# Patient Record
Sex: MALE | Race: BLACK OR AFRICAN AMERICAN | NOT HISPANIC OR LATINO | URBAN - METROPOLITAN AREA
[De-identification: names, ages, dates, MRNs, and addresses within clinical notes are randomized per-mention and may not be internally consistent; named-entity substitution may affect disease eponyms.]

---

## 2024-01-10 ENCOUNTER — EMERGENCY (EMERGENCY)
Facility: HOSPITAL | Age: 34
LOS: 1 days | Discharge: HOME | End: 2024-01-10
Admitting: EMERGENCY MEDICINE
Payer: COMMERCIAL

## 2024-01-10 VITALS
DIASTOLIC BLOOD PRESSURE: 66 MMHG | SYSTOLIC BLOOD PRESSURE: 106 MMHG | RESPIRATION RATE: 16 BRPM | TEMPERATURE: 99 F | OXYGEN SATURATION: 99 % | HEART RATE: 77 BPM

## 2024-01-10 VITALS
RESPIRATION RATE: 18 BRPM | HEIGHT: 68 IN | WEIGHT: 130.07 LBS | DIASTOLIC BLOOD PRESSURE: 72 MMHG | TEMPERATURE: 97 F | SYSTOLIC BLOOD PRESSURE: 120 MMHG | OXYGEN SATURATION: 94 % | HEART RATE: 75 BPM

## 2024-01-10 LAB
ALBUMIN SERPL ELPH-MCNC: 3.9 G/DL — SIGNIFICANT CHANGE UP (ref 3.4–5)
ALBUMIN SERPL ELPH-MCNC: 3.9 G/DL — SIGNIFICANT CHANGE UP (ref 3.4–5)
ALP SERPL-CCNC: 97 U/L — SIGNIFICANT CHANGE UP (ref 40–120)
ALP SERPL-CCNC: 97 U/L — SIGNIFICANT CHANGE UP (ref 40–120)
ALT FLD-CCNC: 13 U/L — SIGNIFICANT CHANGE UP (ref 12–42)
ALT FLD-CCNC: 13 U/L — SIGNIFICANT CHANGE UP (ref 12–42)
ANION GAP SERPL CALC-SCNC: 8 MMOL/L — LOW (ref 9–16)
ANION GAP SERPL CALC-SCNC: 8 MMOL/L — LOW (ref 9–16)
AST SERPL-CCNC: 21 U/L — SIGNIFICANT CHANGE UP (ref 15–37)
AST SERPL-CCNC: 21 U/L — SIGNIFICANT CHANGE UP (ref 15–37)
BASOPHILS # BLD AUTO: 0.05 K/UL — SIGNIFICANT CHANGE UP (ref 0–0.2)
BASOPHILS # BLD AUTO: 0.05 K/UL — SIGNIFICANT CHANGE UP (ref 0–0.2)
BASOPHILS NFR BLD AUTO: 0.5 % — SIGNIFICANT CHANGE UP (ref 0–2)
BASOPHILS NFR BLD AUTO: 0.5 % — SIGNIFICANT CHANGE UP (ref 0–2)
BILIRUB SERPL-MCNC: 0.3 MG/DL — SIGNIFICANT CHANGE UP (ref 0.2–1.2)
BILIRUB SERPL-MCNC: 0.3 MG/DL — SIGNIFICANT CHANGE UP (ref 0.2–1.2)
BUN SERPL-MCNC: 14 MG/DL — SIGNIFICANT CHANGE UP (ref 7–23)
BUN SERPL-MCNC: 14 MG/DL — SIGNIFICANT CHANGE UP (ref 7–23)
CALCIUM SERPL-MCNC: 8.9 MG/DL — SIGNIFICANT CHANGE UP (ref 8.5–10.5)
CALCIUM SERPL-MCNC: 8.9 MG/DL — SIGNIFICANT CHANGE UP (ref 8.5–10.5)
CHLORIDE SERPL-SCNC: 102 MMOL/L — SIGNIFICANT CHANGE UP (ref 96–108)
CHLORIDE SERPL-SCNC: 102 MMOL/L — SIGNIFICANT CHANGE UP (ref 96–108)
CO2 SERPL-SCNC: 28 MMOL/L — SIGNIFICANT CHANGE UP (ref 22–31)
CO2 SERPL-SCNC: 28 MMOL/L — SIGNIFICANT CHANGE UP (ref 22–31)
CREAT SERPL-MCNC: 0.82 MG/DL — SIGNIFICANT CHANGE UP (ref 0.5–1.3)
CREAT SERPL-MCNC: 0.82 MG/DL — SIGNIFICANT CHANGE UP (ref 0.5–1.3)
EGFR: 118 ML/MIN/1.73M2 — SIGNIFICANT CHANGE UP
EGFR: 118 ML/MIN/1.73M2 — SIGNIFICANT CHANGE UP
EOSINOPHIL # BLD AUTO: 0.01 K/UL — SIGNIFICANT CHANGE UP (ref 0–0.5)
EOSINOPHIL # BLD AUTO: 0.01 K/UL — SIGNIFICANT CHANGE UP (ref 0–0.5)
EOSINOPHIL NFR BLD AUTO: 0.1 % — SIGNIFICANT CHANGE UP (ref 0–6)
EOSINOPHIL NFR BLD AUTO: 0.1 % — SIGNIFICANT CHANGE UP (ref 0–6)
GLUCOSE SERPL-MCNC: 104 MG/DL — HIGH (ref 70–99)
GLUCOSE SERPL-MCNC: 104 MG/DL — HIGH (ref 70–99)
HCT VFR BLD CALC: 36.5 % — LOW (ref 39–50)
HCT VFR BLD CALC: 36.5 % — LOW (ref 39–50)
HGB BLD-MCNC: 12.9 G/DL — LOW (ref 13–17)
HGB BLD-MCNC: 12.9 G/DL — LOW (ref 13–17)
IMM GRANULOCYTES NFR BLD AUTO: 0.4 % — SIGNIFICANT CHANGE UP (ref 0–0.9)
IMM GRANULOCYTES NFR BLD AUTO: 0.4 % — SIGNIFICANT CHANGE UP (ref 0–0.9)
LACTATE BLDV-MCNC: 0.8 MMOL/L — SIGNIFICANT CHANGE UP (ref 0.5–2)
LACTATE BLDV-MCNC: 0.8 MMOL/L — SIGNIFICANT CHANGE UP (ref 0.5–2)
LYMPHOCYTES # BLD AUTO: 1.65 K/UL — SIGNIFICANT CHANGE UP (ref 1–3.3)
LYMPHOCYTES # BLD AUTO: 1.65 K/UL — SIGNIFICANT CHANGE UP (ref 1–3.3)
LYMPHOCYTES # BLD AUTO: 16.9 % — SIGNIFICANT CHANGE UP (ref 13–44)
LYMPHOCYTES # BLD AUTO: 16.9 % — SIGNIFICANT CHANGE UP (ref 13–44)
MAGNESIUM SERPL-MCNC: 2.1 MG/DL — SIGNIFICANT CHANGE UP (ref 1.6–2.6)
MAGNESIUM SERPL-MCNC: 2.1 MG/DL — SIGNIFICANT CHANGE UP (ref 1.6–2.6)
MCHC RBC-ENTMCNC: 31.2 PG — SIGNIFICANT CHANGE UP (ref 27–34)
MCHC RBC-ENTMCNC: 31.2 PG — SIGNIFICANT CHANGE UP (ref 27–34)
MCHC RBC-ENTMCNC: 35.3 GM/DL — SIGNIFICANT CHANGE UP (ref 32–36)
MCHC RBC-ENTMCNC: 35.3 GM/DL — SIGNIFICANT CHANGE UP (ref 32–36)
MCV RBC AUTO: 88.4 FL — SIGNIFICANT CHANGE UP (ref 80–100)
MCV RBC AUTO: 88.4 FL — SIGNIFICANT CHANGE UP (ref 80–100)
MONOCYTES # BLD AUTO: 0.79 K/UL — SIGNIFICANT CHANGE UP (ref 0–0.9)
MONOCYTES # BLD AUTO: 0.79 K/UL — SIGNIFICANT CHANGE UP (ref 0–0.9)
MONOCYTES NFR BLD AUTO: 8.1 % — SIGNIFICANT CHANGE UP (ref 2–14)
MONOCYTES NFR BLD AUTO: 8.1 % — SIGNIFICANT CHANGE UP (ref 2–14)
NEUTROPHILS # BLD AUTO: 7.25 K/UL — SIGNIFICANT CHANGE UP (ref 1.8–7.4)
NEUTROPHILS # BLD AUTO: 7.25 K/UL — SIGNIFICANT CHANGE UP (ref 1.8–7.4)
NEUTROPHILS NFR BLD AUTO: 74 % — SIGNIFICANT CHANGE UP (ref 43–77)
NEUTROPHILS NFR BLD AUTO: 74 % — SIGNIFICANT CHANGE UP (ref 43–77)
NRBC # BLD: 0 /100 WBCS — SIGNIFICANT CHANGE UP (ref 0–0)
NRBC # BLD: 0 /100 WBCS — SIGNIFICANT CHANGE UP (ref 0–0)
PLATELET # BLD AUTO: 281 K/UL — SIGNIFICANT CHANGE UP (ref 150–400)
PLATELET # BLD AUTO: 281 K/UL — SIGNIFICANT CHANGE UP (ref 150–400)
POTASSIUM SERPL-MCNC: 3.8 MMOL/L — SIGNIFICANT CHANGE UP (ref 3.5–5.3)
POTASSIUM SERPL-MCNC: 3.8 MMOL/L — SIGNIFICANT CHANGE UP (ref 3.5–5.3)
POTASSIUM SERPL-SCNC: 3.8 MMOL/L — SIGNIFICANT CHANGE UP (ref 3.5–5.3)
POTASSIUM SERPL-SCNC: 3.8 MMOL/L — SIGNIFICANT CHANGE UP (ref 3.5–5.3)
PROT SERPL-MCNC: 7.2 G/DL — SIGNIFICANT CHANGE UP (ref 6.4–8.2)
PROT SERPL-MCNC: 7.2 G/DL — SIGNIFICANT CHANGE UP (ref 6.4–8.2)
RBC # BLD: 4.13 M/UL — LOW (ref 4.2–5.8)
RBC # BLD: 4.13 M/UL — LOW (ref 4.2–5.8)
RBC # FLD: 12.5 % — SIGNIFICANT CHANGE UP (ref 10.3–14.5)
RBC # FLD: 12.5 % — SIGNIFICANT CHANGE UP (ref 10.3–14.5)
SODIUM SERPL-SCNC: 138 MMOL/L — SIGNIFICANT CHANGE UP (ref 132–145)
SODIUM SERPL-SCNC: 138 MMOL/L — SIGNIFICANT CHANGE UP (ref 132–145)
TROPONIN I, HIGH SENSITIVITY RESULT: <4 NG/L — SIGNIFICANT CHANGE UP
TROPONIN I, HIGH SENSITIVITY RESULT: <4 NG/L — SIGNIFICANT CHANGE UP
WBC # BLD: 9.79 K/UL — SIGNIFICANT CHANGE UP (ref 3.8–10.5)
WBC # BLD: 9.79 K/UL — SIGNIFICANT CHANGE UP (ref 3.8–10.5)
WBC # FLD AUTO: 9.79 K/UL — SIGNIFICANT CHANGE UP (ref 3.8–10.5)
WBC # FLD AUTO: 9.79 K/UL — SIGNIFICANT CHANGE UP (ref 3.8–10.5)

## 2024-01-10 PROCEDURE — 71045 X-RAY EXAM CHEST 1 VIEW: CPT | Mod: 26

## 2024-01-10 PROCEDURE — 99285 EMERGENCY DEPT VISIT HI MDM: CPT

## 2024-01-10 RX ORDER — KETOROLAC TROMETHAMINE 30 MG/ML
15 SYRINGE (ML) INJECTION ONCE
Refills: 0 | Status: DISCONTINUED | OUTPATIENT
Start: 2024-01-10 | End: 2024-01-10

## 2024-01-10 RX ORDER — METOCLOPRAMIDE HCL 10 MG
10 TABLET ORAL ONCE
Refills: 0 | Status: COMPLETED | OUTPATIENT
Start: 2024-01-10 | End: 2024-01-10

## 2024-01-10 RX ORDER — SODIUM CHLORIDE 9 MG/ML
1000 INJECTION INTRAMUSCULAR; INTRAVENOUS; SUBCUTANEOUS ONCE
Refills: 0 | Status: COMPLETED | OUTPATIENT
Start: 2024-01-10 | End: 2024-01-10

## 2024-01-10 RX ADMIN — SODIUM CHLORIDE 1000 MILLILITER(S): 9 INJECTION INTRAMUSCULAR; INTRAVENOUS; SUBCUTANEOUS at 17:00

## 2024-01-10 NOTE — ED PROVIDER NOTE - PATIENT PORTAL LINK FT
You can access the FollowMyHealth Patient Portal offered by [facility] by registering at the following website: [email or web address]. By joining [employer]’s FollowMyHealth portal, you will also be able to view your health information using other applications (apps) compatible with our system. You can access the FollowMyHealth Patient Portal offered by [facility] by registering at the following website: [email or web address]. By joining [employer]’s FollowMyHealth portal, you will also be able to view your health information using other applications (apps) compatible with our system.

## 2024-01-10 NOTE — ED PROVIDER NOTE - CARE PROVIDER_API CALL
your primary care doctor,   Phone: (   )    -  Fax: (   )    -  Follow Up Time: 4-6 Days    your neurologist,   Phone: (   )    -  Fax: (   )    -  Follow Up Time: 4-6 Days

## 2024-01-10 NOTE — ED ADULT NURSE NOTE - PAIN: PRESENCE, MLM
denies pain/discomfort (Rating = 0) Interpolation Flap Text: A decision was made to reconstruct the defect utilizing an interpolation axial flap and a staged reconstruction.  A telfa template was made of the defect.  This telfa template was then used to outline the interpolation flap.  The donor area for the pedicle flap was then injected with anesthesia.  The flap was excised through the skin and subcutaneous tissue down to the layer of the underlying musculature.  The interpolation flap was carefully excised within this deep plane to maintain its blood supply.  The edges of the donor site were undermined.   The donor site was closed in a primary fashion.  The pedicle was then rotated into position and sutured.  Once the tube was sutured into place, adequate blood supply was confirmed with blanching and refill.  The pedicle was then wrapped with xeroform gauze and dressed appropriately with a telfa and gauze bandage to ensure continued blood supply and protect the attached pedicle.

## 2024-01-10 NOTE — ED PROVIDER NOTE - PHYSICAL EXAMINATION
CONSTITUTIONAL: non-toxic appearing male, nad   SKIN: skin exam is warm and dry  HEAD: Normocephalic; atraumatic  EYES: PERRL, EOM intact; conjunctiva and sclera clear.  ENT: Dry MM   NECK: ROM intact.  CARD: S1, S2 normal, no murmur  RESP: No wheezes, rales or rhonchi. Good air movement bilaterally  ABD: soft; non-distended; non-tender.    EXT: Normal ROM.    NEURO: awake, alert, following commands, oriented, grossly unremarkable. No Focal deficits. GCS 15.   PSYCH: Cooperative, appropriate. CONSTITUTIONAL: non-toxic appearing male, nad   SKIN: skin exam is warm and dry  HEAD: Normocephalic; atraumatic  EYES: PERRL, EOM intact; conjunctiva and sclera clear.  ENT: Dry MM   NECK: ROM intact.  CARD: S1, S2 normal, no murmur  RESP: No wheezes, rales or rhonchi. Good air movement bilaterally  ABD: soft; non-distended; non-tender.    EXT: Normal ROM.    NEURO: awake, alert, following commands, oriented, grossly unremarkable. No Focal deficits. GCS 15. Steady gait.   PSYCH: Cooperative, appropriate.

## 2024-01-10 NOTE — ED ADULT TRIAGE NOTE - CHIEF COMPLAINT QUOTE
Pt BIBA from work c/o feeling dizzy with shortness of breath and tremors while at work. Pt states symptoms resolved but he feels fatigued. Pt took inhaler (PMH of asthma and anemia) without relief. Pt states he had similar incident last week, went to PCP who wrote him for MRI of head today. Pt unable to go to scan due to condition.

## 2024-01-10 NOTE — ED ADULT NURSE NOTE - NSFALLUNIVINTERV_ED_ALL_ED
Bed/Stretcher in lowest position, wheels locked, appropriate side rails in place/Call bell, personal items and telephone in reach/Instruct patient to call for assistance before getting out of bed/chair/stretcher/Non-slip footwear applied when patient is off stretcher/[place] to call system/Physically safe environment - no spills, clutter or unnecessary equipment/Purposeful proactive rounding/Room/bathroom lighting operational, light cord in reach Bed/Stretcher in lowest position, wheels locked, appropriate side rails in place/Call bell, personal items and telephone in reach/Instruct patient to call for assistance before getting out of bed/chair/stretcher/Non-slip footwear applied when patient is off stretcher/[place] to call system/Physically safe environment - no spills, clutter or unnecessary equipment/Purposeful proactive rounding/Room/bathroom lighting operational, light cord in reach

## 2024-01-10 NOTE — ED PROVIDER NOTE - NSFOLLOWUPINSTRUCTIONS_ED_ALL_ED_FT
You were noted to have what is called, a syncopal episode, which is an event when you temporarily lose consciousness. These events happen for a variety of reasons and you were kept in the hospital to evaluate what the cause of your event was. Thankfully, these events in themselves do not leave any long lasting effects on your body, however, they may happen again if the cause of the problem is not found and treated if need be. Many people never find out what caused their event. If you have been advised to follow up with any doctors, or specialists, please be sure to do so.       Dizziness  Dizziness is a common problem. It is a feeling of unsteadiness or light-headedness. You may feel like you are about to faint. Dizziness can lead to injury if you stumble or fall. Anyone can become dizzy, but dizziness is more common in older adults. This condition can be caused by a number of things, including medicines, dehydration, or illness.    Follow these instructions at home:  Eating and drinking     Drink enough fluid to keep your urine clear or pale yellow. This helps to keep you from becoming dehydrated. Try to drink more clear fluids, such as water.  Do not drink alcohol.  Limit your caffeine intake if told to do so by your health care provider. Check ingredients and nutrition facts to see if a food or beverage contains caffeine.  Limit your salt (sodium) intake if told to do so by your health care provider. Check ingredients and nutrition facts to see if a food or beverage contains sodium.  Activity     Avoid making quick movements.  Rise slowly from chairs and steady yourself until you feel okay.  In the morning, first sit up on the side of the bed. When you feel okay, stand slowly while you hold onto something until you know that your balance is fine.  If you need to  one place for a long time, move your legs often. Tighten and relax the muscles in your legs while you are standing.  Do not drive or use heavy machinery if you feel dizzy.  Avoid bending down if you feel dizzy. Place items in your home so that they are easy for you to reach without leaning over.  Lifestyle     Do not use any products that contain nicotine or tobacco, such as cigarettes and e-cigarettes. If you need help quitting, ask your health care provider.  Try to reduce your stress level by using methods such as yoga or meditation. Talk with your health care provider if you need help to manage your stress.  General instructions     Watch your dizziness for any changes.  Take over-the-counter and prescription medicines only as told by your health care provider. Talk with your health care provider if you think that your dizziness is caused by a medicine that you are taking.  Tell a friend or a family member that you are feeling dizzy. If he or she notices any changes in your behavior, have this person call your health care provider.  Keep all follow-up visits as told by your health care provider. This is important.  Contact a health care provider if:  Your dizziness does not go away.  Your dizziness or light-headedness gets worse.  You feel nauseous.  You have reduced hearing.  You have new symptoms.  You are unsteady on your feet or you feel like the room is spinning.  Get help right away if:  You vomit or have diarrhea and are unable to eat or drink anything.  You have problems talking, walking, swallowing, or using your arms, hands, or legs.  You feel generally weak.  You are not thinking clearly or you have trouble forming sentences. It may take a friend or family member to notice this.  You have chest pain, abdominal pain, shortness of breath, or sweating.  Your vision changes.  You have any bleeding.  You have a severe headache.  You have neck pain or a stiff neck.  You have a fever.  These symptoms may represent a serious problem that is an emergency. Do not wait to see if the symptoms will go away. Get medical help right away. Call your local emergency services (911 in the [place] Do not drive yourself to the hospital.     Summary  Dizziness is a feeling of unsteadiness or light-headedness. This condition can be caused by a number of things, including medicines, dehydration, or illness.  Anyone can become dizzy, but dizziness is more common in older adults.  Drink enough fluid to keep your urine clear or pale yellow. Do not drink alcohol.  Avoid making quick movements if you feel dizzy. Monitor your dizziness for any changes.  This information is not intended to replace advice given to you by your health care provider. Make sure you discuss any questions you have with your health care provider.

## 2024-01-10 NOTE — ED PROVIDER NOTE - PROVIDER TOKENS
FREE:[LAST:[your primary care doctor],PHONE:[(   )    -],FAX:[(   )    -],FOLLOWUP:[4-6 Days]],FREE:[LAST:[your neurologist],PHONE:[(   )    -],FAX:[(   )    -],FOLLOWUP:[4-6 Days]]

## 2024-01-10 NOTE — ED PROVIDER NOTE - OBJECTIVE STATEMENT
33 year old male, past medical history anemia, asthma, who presents s/p lightheadedness. patient with episode of lightheadedness, nausea, "spots in vision" followed by witnessed syncopal episode while sitting in chair. patient did not fall or hit head, no tonic-clonic activity or urinary/bowel incontinence. patient with hx episodes similar in past, most recently x1 week ago. patient follows with neurologist outpatient and plan for outpatient MR head. denies vision changes, neck pain, chest pain, shortness of breath, back pain, urinary symptoms. denies drug use.

## 2024-01-10 NOTE — ED PROVIDER NOTE - CLINICAL SUMMARY MEDICAL DECISION MAKING FREE TEXT BOX
33 year old male, past medical history anemia, asthma, who presents with lightheadedness. patient with episode of lightheadedness, nausea, spots in vision followed by witnessed syncopal episode. patient with hx similar episodes. patient arrives feeling well, no other complaints. no head injury, no tonic-clonic activity or urinary/bowel incontinence. symptoms likely consistent with vasovagal syncope. plan for labs, fluids, ekg/cxr.

## 2024-01-10 NOTE — ED PROVIDER NOTE - PROGRESS NOTE DETAILS
patient feeling well, results thoroughly discussed with patient, educated on signs and symptoms to be aware of that should prompt return to the er, patient verbalizes understanding and agreeable with plan. patient has scheduled outpatient fu with pmd and neuro. patient and family verbalize understanding and agreeable with plan.

## 2024-01-10 NOTE — ED ADULT NURSE NOTE - OBJECTIVE STATEMENT
aox3, breathing even and unlabored on RA c/o dizziness. experienced episode and friday and today, denies falling.

## 2024-01-12 DIAGNOSIS — R11.0 NAUSEA: ICD-10-CM

## 2024-01-12 DIAGNOSIS — J45.909 UNSPECIFIED ASTHMA, UNCOMPLICATED: ICD-10-CM

## 2024-01-12 DIAGNOSIS — R42 DIZZINESS AND GIDDINESS: ICD-10-CM

## 2024-01-12 DIAGNOSIS — R55 SYNCOPE AND COLLAPSE: ICD-10-CM

## 2024-01-12 DIAGNOSIS — H43.393 OTHER VITREOUS OPACITIES, BILATERAL: ICD-10-CM

## 2024-01-12 DIAGNOSIS — Z86.2 PERSONAL HISTORY OF DISEASES OF THE BLOOD AND BLOOD-FORMING ORGANS AND CERTAIN DISORDERS INVOLVING THE IMMUNE MECHANISM: ICD-10-CM

## 2024-01-12 DIAGNOSIS — Z88.0 ALLERGY STATUS TO PENICILLIN: ICD-10-CM

## 2024-05-10 ENCOUNTER — EMERGENCY (EMERGENCY)
Facility: HOSPITAL | Age: 34
LOS: 1 days | Discharge: HOME | End: 2024-05-10
Attending: STUDENT IN AN ORGANIZED HEALTH CARE EDUCATION/TRAINING PROGRAM
Payer: COMMERCIAL

## 2024-05-10 VITALS
DIASTOLIC BLOOD PRESSURE: 87 MMHG | SYSTOLIC BLOOD PRESSURE: 139 MMHG | TEMPERATURE: 98 F | HEART RATE: 78 BPM | HEIGHT: 68 IN | RESPIRATION RATE: 18 BRPM | WEIGHT: 138.89 LBS | OXYGEN SATURATION: 98 %

## 2024-05-10 LAB
ALBUMIN SERPL ELPH-MCNC: 3.7 G/DL — SIGNIFICANT CHANGE UP (ref 3.5–5)
ALP SERPL-CCNC: 72 U/L — SIGNIFICANT CHANGE UP (ref 40–120)
ALT FLD-CCNC: 19 U/L DA — SIGNIFICANT CHANGE UP (ref 10–60)
ANION GAP SERPL CALC-SCNC: 3 MMOL/L — LOW (ref 5–17)
AST SERPL-CCNC: 19 U/L — SIGNIFICANT CHANGE UP (ref 10–40)
BASOPHILS # BLD AUTO: 0.07 K/UL — SIGNIFICANT CHANGE UP (ref 0–0.2)
BASOPHILS NFR BLD AUTO: 1.1 % — SIGNIFICANT CHANGE UP (ref 0–2)
BILIRUB SERPL-MCNC: 0.3 MG/DL — SIGNIFICANT CHANGE UP (ref 0.2–1.2)
BUN SERPL-MCNC: 12 MG/DL — SIGNIFICANT CHANGE UP (ref 7–18)
CALCIUM SERPL-MCNC: 8.7 MG/DL — SIGNIFICANT CHANGE UP (ref 8.4–10.5)
CHLORIDE SERPL-SCNC: 107 MMOL/L — SIGNIFICANT CHANGE UP (ref 96–108)
CO2 SERPL-SCNC: 28 MMOL/L — SIGNIFICANT CHANGE UP (ref 22–31)
CREAT SERPL-MCNC: 0.77 MG/DL — SIGNIFICANT CHANGE UP (ref 0.5–1.3)
EGFR: 120 ML/MIN/1.73M2 — SIGNIFICANT CHANGE UP
EOSINOPHIL # BLD AUTO: 0.09 K/UL — SIGNIFICANT CHANGE UP (ref 0–0.5)
EOSINOPHIL NFR BLD AUTO: 1.4 % — SIGNIFICANT CHANGE UP (ref 0–6)
GLUCOSE SERPL-MCNC: 92 MG/DL — SIGNIFICANT CHANGE UP (ref 70–99)
HCT VFR BLD CALC: 35.7 % — LOW (ref 39–50)
HGB BLD-MCNC: 12.8 G/DL — LOW (ref 13–17)
IMM GRANULOCYTES NFR BLD AUTO: 0.5 % — SIGNIFICANT CHANGE UP (ref 0–0.9)
LYMPHOCYTES # BLD AUTO: 3.04 K/UL — SIGNIFICANT CHANGE UP (ref 1–3.3)
LYMPHOCYTES # BLD AUTO: 45.7 % — HIGH (ref 13–44)
MCHC RBC-ENTMCNC: 31.1 PG — SIGNIFICANT CHANGE UP (ref 27–34)
MCHC RBC-ENTMCNC: 35.9 GM/DL — SIGNIFICANT CHANGE UP (ref 32–36)
MCV RBC AUTO: 86.9 FL — SIGNIFICANT CHANGE UP (ref 80–100)
MONOCYTES # BLD AUTO: 0.59 K/UL — SIGNIFICANT CHANGE UP (ref 0–0.9)
MONOCYTES NFR BLD AUTO: 8.9 % — SIGNIFICANT CHANGE UP (ref 2–14)
NEUTROPHILS # BLD AUTO: 2.83 K/UL — SIGNIFICANT CHANGE UP (ref 1.8–7.4)
NEUTROPHILS NFR BLD AUTO: 42.4 % — LOW (ref 43–77)
NRBC # BLD: 0 /100 WBCS — SIGNIFICANT CHANGE UP (ref 0–0)
PLATELET # BLD AUTO: 297 K/UL — SIGNIFICANT CHANGE UP (ref 150–400)
POTASSIUM SERPL-MCNC: 4.4 MMOL/L — SIGNIFICANT CHANGE UP (ref 3.5–5.3)
POTASSIUM SERPL-SCNC: 4.4 MMOL/L — SIGNIFICANT CHANGE UP (ref 3.5–5.3)
PROT SERPL-MCNC: 6.8 G/DL — SIGNIFICANT CHANGE UP (ref 6–8.3)
RBC # BLD: 4.11 M/UL — LOW (ref 4.2–5.8)
RBC # FLD: 13.1 % — SIGNIFICANT CHANGE UP (ref 10.3–14.5)
SODIUM SERPL-SCNC: 138 MMOL/L — SIGNIFICANT CHANGE UP (ref 135–145)
TSH SERPL-MCNC: 0.82 UU/ML — SIGNIFICANT CHANGE UP (ref 0.34–4.82)
WBC # BLD: 6.65 K/UL — SIGNIFICANT CHANGE UP (ref 3.8–10.5)
WBC # FLD AUTO: 6.65 K/UL — SIGNIFICANT CHANGE UP (ref 3.8–10.5)

## 2024-05-10 PROCEDURE — 70498 CT ANGIOGRAPHY NECK: CPT | Mod: MC

## 2024-05-10 PROCEDURE — 82962 GLUCOSE BLOOD TEST: CPT

## 2024-05-10 PROCEDURE — 84443 ASSAY THYROID STIM HORMONE: CPT

## 2024-05-10 PROCEDURE — 70496 CT ANGIOGRAPHY HEAD: CPT | Mod: 26,MC

## 2024-05-10 PROCEDURE — 70498 CT ANGIOGRAPHY NECK: CPT | Mod: 26,MC

## 2024-05-10 PROCEDURE — 99284 EMERGENCY DEPT VISIT MOD MDM: CPT | Mod: 25

## 2024-05-10 PROCEDURE — 85025 COMPLETE CBC W/AUTO DIFF WBC: CPT

## 2024-05-10 PROCEDURE — 80053 COMPREHEN METABOLIC PANEL: CPT

## 2024-05-10 PROCEDURE — 70450 CT HEAD/BRAIN W/O DYE: CPT | Mod: MC

## 2024-05-10 PROCEDURE — 96374 THER/PROPH/DIAG INJ IV PUSH: CPT | Mod: XU

## 2024-05-10 PROCEDURE — 36415 COLL VENOUS BLD VENIPUNCTURE: CPT

## 2024-05-10 PROCEDURE — 99285 EMERGENCY DEPT VISIT HI MDM: CPT

## 2024-05-10 PROCEDURE — 70496 CT ANGIOGRAPHY HEAD: CPT | Mod: MC

## 2024-05-10 PROCEDURE — 70450 CT HEAD/BRAIN W/O DYE: CPT | Mod: 26,59,MC

## 2024-05-10 RX ORDER — FAMOTIDINE 10 MG/ML
20 INJECTION INTRAVENOUS ONCE
Refills: 0 | Status: COMPLETED | OUTPATIENT
Start: 2024-05-10 | End: 2024-05-10

## 2024-05-10 RX ADMIN — FAMOTIDINE 20 MILLIGRAM(S): 10 INJECTION INTRAVENOUS at 21:58

## 2024-05-10 NOTE — ED PROVIDER NOTE - NSFOLLOWUPINSTRUCTIONS_ED_ALL_ED_FT
- Please follow up with your Primary Care Doctor within 1-2 weeks. Bring your results from today.    - Please follow up with your Neurologist. Bring results from today.     - Take your prescribed medications as indicated.     - Be sure to return to the ED if you develop new, worsening, or any distressing symptoms.

## 2024-05-10 NOTE — ED PROVIDER NOTE - CLINICAL SUMMARY MEDICAL DECISION MAKING FREE TEXT BOX
33-year-old male with past medical history of seizure disorder on levetiracetam, pituitary enlargement on MRI diagnosed earlier this year, anxiety on escitalopram, presents emergency department with "pepper taste in his mouth for the past 2 days", left facial numbness, left lower extremity numbness 2 days ago after sleeping that resolved.  Patient's symptoms have improved.  Patient denies any fevers or chills, URI symptoms, cough, chest pain, lightheadedness, palpitations, headaches or vision changes, weakness to face or extremities, urinary symptoms, leg swelling.  Patient denies any head trauma.  Patient states few days ago he had a lot of shellfish and then had some abdominal pain and feels that the vomiting.  Of note patient states that Sunday Monday he ran out of his medications did not get escitalopram levetiracetam, restarted them on Tuesday Wednesday.    ALL SYSTEMS REVIEWED AND NEGATIVE EXCEPT AS ABOVE IN THE HISTORY     Physical Exam:   GENERAL: no acute distress, non-toxic appearing  HEENT: normal conjunctiva, oral mucosa moist, no tongue lacs, no oropharyngeal lesions  CARDIAC: regular rate and regular rhythm, bp reassuring  PULM: clear to ascultation bilaterally, no increased work of breathing  GI: abdomen nondistended, soft, nontender  : no CVA tenderness, no suprapubic tenderness  NEURO: alert and oriented x 3, normal speech, CN's 3-12 intact, EOMI without nystagmus, ROSENDO, 5/5 strength all extremities, finger to nose intact  MSK: no visible deformities, no peripheral edema, calf tenderness/redness/swelling  SKIN: no visible rashes  PSYCH: appropriate mood and affect     MDM: Likely serotonin discontinuation syndrome versus restarting of serotonergic meds causing this symptomatology, possible scrambled, low suspicion for primary neurologic etiology of patient's symptoms.  Plan for basic labs, CT head, CTA, Pepcid.  Anticipate discharge with neurology follow-up which patient has. 33-year-old male with past medical history of seizure disorder on levetiracetam, pituitary enlargement on MRI diagnosed earlier this year, anxiety on escitalopram, presents emergency department with "pepper taste in his mouth for the past 2 days", left facial numbness, left lower extremity numbness 2 days ago after sleeping that resolved.  Patient's symptoms have improved.  Patient denies any fevers or chills, URI symptoms, cough, chest pain, lightheadedness, palpitations, headaches or vision changes, weakness to face or extremities, urinary symptoms, leg swelling.  Patient denies any head trauma.  Patient states few days ago he had a lot of shellfish and then had some abdominal pain and feels that the vomiting.  Of note patient states that Sunday Monday he ran out of his medications did not get escitalopram levetiracetam, restarted them on Tuesday Wednesday.    ALL SYSTEMS REVIEWED AND NEGATIVE EXCEPT AS ABOVE IN THE HISTORY     Physical Exam:   GENERAL: no acute distress, non-toxic appearing  HEENT: normal conjunctiva, oral mucosa moist, no tongue lacs, no oropharyngeal lesions  CARDIAC: regular rate and regular rhythm, bp reassuring  PULM: clear to ascultation bilaterally, no increased work of breathing  GI: abdomen nondistended, soft, nontender  : no CVA tenderness, no suprapubic tenderness  NEURO: alert and oriented x 3, normal speech, CN's 3-12 intact, EOMI without nystagmus, ROSENDO, 5/5 strength all extremities, finger to nose intact  MSK: no visible deformities, no peripheral edema, calf tenderness/redness/swelling  SKIN: no visible rashes  PSYCH: appropriate mood and affect     MDM: Likely serotonin discontinuation syndrome versus restarting of serotonergic meds causing this symptomatology, possible scrambled, low suspicion for primary neurologic etiology of patient's symptoms.  Plan for basic labs, CT head, CTA, Pepcid.  Anticipate discharge with neurology follow-up which patient has.    ===================================  update (Tony Crespo DO; emergency medicine physician) labs, CT's are without emergent findings, pt without focal neurologic deficits on exam, pt has a neurologist he can follow up with. Safe for discharge at this time.

## 2024-05-10 NOTE — ED ADULT NURSE NOTE - OBJECTIVE STATEMENT
pt reports that he has left side of the face is numb for few days .no Facial droop , or arm weakness noted . reports hx of\ seizires on keppra. Per pt felt like his left eye was low for a few days and been sleeping on and off all day. Also reports left sidednumbess 2 days ago now resolved.

## 2024-05-10 NOTE — ED ADULT TRIAGE NOTE - CHIEF COMPLAINT QUOTE
pt reports that he has left side of the face is numb for few days .no Facial droop , or arm weakness noted .

## 2024-05-10 NOTE — ED PROVIDER NOTE - PATIENT PORTAL LINK FT
You can access the FollowMyHealth Patient Portal offered by [facility] by registering at the following website: [email or web address]. By joining [employer]’s FollowMyHealth portal, you will also be able to view your health information using other applications (apps) compatible with our system.

## 2024-05-11 VITALS
OXYGEN SATURATION: 98 % | SYSTOLIC BLOOD PRESSURE: 104 MMHG | HEART RATE: 62 BPM | RESPIRATION RATE: 18 BRPM | DIASTOLIC BLOOD PRESSURE: 67 MMHG | TEMPERATURE: 98 F

## 2024-10-09 ENCOUNTER — EMERGENCY (EMERGENCY)
Facility: HOSPITAL | Age: 34
LOS: 1 days | Discharge: HOME | End: 2024-10-09
Attending: EMERGENCY MEDICINE
Payer: COMMERCIAL

## 2024-10-09 VITALS
OXYGEN SATURATION: 100 % | DIASTOLIC BLOOD PRESSURE: 92 MMHG | SYSTOLIC BLOOD PRESSURE: 145 MMHG | HEART RATE: 73 BPM | TEMPERATURE: 98 F | WEIGHT: 130.07 LBS | RESPIRATION RATE: 18 BRPM

## 2024-10-09 PROBLEM — G40.909 EPILEPSY, UNSPECIFIED, NOT INTRACTABLE, WITHOUT STATUS EPILEPTICUS: Chronic | Status: ACTIVE | Noted: 2024-05-10

## 2024-10-09 PROBLEM — F41.9 ANXIETY DISORDER, UNSPECIFIED: Chronic | Status: ACTIVE | Noted: 2024-05-10

## 2024-10-09 LAB
FLUAV AG NPH QL: SIGNIFICANT CHANGE UP
FLUBV AG NPH QL: SIGNIFICANT CHANGE UP
SARS-COV-2 RNA SPEC QL NAA+PROBE: SIGNIFICANT CHANGE UP

## 2024-10-09 PROCEDURE — 82962 GLUCOSE BLOOD TEST: CPT

## 2024-10-09 PROCEDURE — 99284 EMERGENCY DEPT VISIT MOD MDM: CPT

## 2024-10-09 PROCEDURE — 99283 EMERGENCY DEPT VISIT LOW MDM: CPT

## 2024-10-09 PROCEDURE — 87636 SARSCOV2 & INF A&B AMP PRB: CPT

## 2024-10-09 RX ORDER — LEVETIRACETAM 1000 MG
500 TABLET ORAL ONCE
Refills: 0 | Status: COMPLETED | OUTPATIENT
Start: 2024-10-09 | End: 2024-10-09

## 2024-10-09 RX ADMIN — Medication 500 MILLIGRAM(S): at 09:13

## 2024-10-09 RX ADMIN — Medication 600 MILLIGRAM(S): at 09:13

## 2024-10-09 NOTE — ED PROVIDER NOTE - OBJECTIVE STATEMENT
33-year-old male with history of seizure disorder currently on Keppra who presents after seizure.  Patient states that he was seated on his couch when he had seizure.  He had aura prior to.  He has been taking his medications as prescribed.  He is currently weaning off sertraline which she feels has lowered his threshold

## 2024-10-09 NOTE — ED PROVIDER NOTE - NSFOLLOWUPINSTRUCTIONS_ED_ALL_ED_FT
You were evaluated after seizure.  Your COVID and flu test were negative.  Please follow-up with your prescriber regarding your medication regimen.  I have placed a referral with neurology and you will be contacted to arrange an appointment.

## 2024-10-09 NOTE — ED PROVIDER NOTE - CLINICAL SUMMARY MEDICAL DECISION MAKING FREE TEXT BOX
33-year-old male with known seizure disorder who presents after seizure at home.  Back to his baseline mental status.  Has been taking his medications as prescribed.  No neurodeficits.  COVID and flu negative.  Will have him follow-up with his neurologist.  I have also placed a referral as the patient was requesting a second opinion for his situation.

## 2024-10-09 NOTE — ED ADULT NURSE NOTE - NS ED NURSE DC INFO COMPLEXITY
Health Care Proxy (HCP) Simple: Patient demonstrates quick and easy understanding/Verbalized Understanding

## 2024-10-09 NOTE — ED ADULT NURSE NOTE - OBJECTIVE STATEMENT
32 y/o male ambulatory Pt A &Ox3, PT reports he had witnessed seizure today, pt on Keppra but started on Lexapro recently and had rash after taking the meds. Pt denies chest pain, SOB, nausea, vomiting, dizziness, fever, cough, chills.

## 2024-10-09 NOTE — ED PROVIDER NOTE - PATIENT PORTAL LINK FT
You can access the FollowMyHealth Patient Portal offered by [facility] by registering at the following website: [email or web address]. By joining [employer]’s FollowMyHealth portal, you will also be able to view your health information using other applications (apps) compatible with our system.

## 2024-10-09 NOTE — ED PROVIDER NOTE - PHYSICAL EXAMINATION
general: Patient well appearing, vital signs within normal limits  HEENT: MMM, trachea midline  Respiratory: No respiratory distress  Neuro: Moves all extremities, no focal neurologic deficits  Skin: No rashes or lesions

## 2025-04-11 NOTE — ED ADULT TRIAGE NOTE - GLASGOW COMA SCALE: EYE OPENING, MLM
Pt a/o x4. VSS. All prescriptions, discharge and follow up instructions given to the patient.  The patient verbalizes understanding and denies questions.  All belongings collected and sent with the patient. The patient was discharged off the unit by wheelchair and PCA in stable condition.      (E4) spontaneous